# Patient Record
Sex: FEMALE | Race: WHITE | Employment: UNEMPLOYED | URBAN - METROPOLITAN AREA
[De-identification: names, ages, dates, MRNs, and addresses within clinical notes are randomized per-mention and may not be internally consistent; named-entity substitution may affect disease eponyms.]

---

## 2017-01-31 ENCOUNTER — HOSPITAL ENCOUNTER (OUTPATIENT)
Dept: ULTRASOUND IMAGING | Facility: CLINIC | Age: 32
Discharge: HOME | End: 2017-01-31
Attending: MIDWIFE | Admitting: MIDWIFE
Payer: MEDICAID

## 2017-01-31 DIAGNOSIS — Z34.82 PRENATAL CARE, SUBSEQUENT PREGNANCY, SECOND TRIMESTER: ICD-10-CM

## 2017-01-31 PROCEDURE — T1013 SIGN LANG/ORAL INTERPRETER: HCPCS | Mod: U3

## 2017-01-31 PROCEDURE — 76805 OB US >/= 14 WKS SNGL FETUS: CPT

## 2017-05-21 ENCOUNTER — HOSPITAL ENCOUNTER (OUTPATIENT)
Facility: CLINIC | Age: 32
Discharge: HOME | End: 2017-05-21
Attending: ADVANCED PRACTICE MIDWIFE | Admitting: ADVANCED PRACTICE MIDWIFE
Payer: COMMERCIAL

## 2017-05-21 ENCOUNTER — OFFICE VISIT (OUTPATIENT)
Dept: INTERPRETER SERVICES | Facility: CLINIC | Age: 32
End: 2017-05-21

## 2017-05-21 VITALS — BODY MASS INDEX: 31.09 KG/M2 | SYSTOLIC BLOOD PRESSURE: 114 MMHG | DIASTOLIC BLOOD PRESSURE: 63 MMHG | WEIGHT: 170 LBS

## 2017-05-21 PROCEDURE — T1013 SIGN LANG/ORAL INTERPRETER: HCPCS | Mod: U3

## 2017-05-21 PROCEDURE — 59025 FETAL NON-STRESS TEST: CPT

## 2017-05-21 PROCEDURE — 99214 OFFICE O/P EST MOD 30 MIN: CPT | Mod: 25

## 2017-05-21 RX ORDER — ONDANSETRON 2 MG/ML
4 INJECTION INTRAMUSCULAR; INTRAVENOUS EVERY 6 HOURS PRN
Status: DISCONTINUED | OUTPATIENT
Start: 2017-05-21 | End: 2017-05-21 | Stop reason: HOSPADM

## 2017-05-21 NOTE — IP AVS SNAPSHOT
[facility]    [address]    [place] [address]    Phone:  [phone]                                       After Visit Summary   5/21/2017    Yudelka Harris    MRN: [id number]           After Visit Summary Signature Page     I have received my discharge instructions, and my questions have been answered. I have discussed any challenges I see with this plan with the nurse or doctor.    ..........................................................................................................................................  Patient/Patient Representative Signature      ..........................................................................................................................................  Patient Representative Print Name and Relationship to Patient    ..................................................               ................................................  Date                                            Time    ..........................................................................................................................................  Reviewed by Signature/Title    ...................................................              ..............................................  Date                                                            Time

## 2017-05-21 NOTE — IP AVS SNAPSHOT
MRN:[id number]                      After Visit Summary   5/21/2017    Yudelka Harris    MRN: [id number]           Thank you!     Thank you for choosing [facility] for your care. Our goal is always to provide you with excellent care. Hearing back from our patients is one way we can continue to improve our services. Please take a few minutes to complete the written survey that you may receive in the mail after you visit with us. Thank you!        Patient Information     Date Of Birth          1985        Designated Caregiver       Most Recent Value    Caregiver    Will someone help with your care after discharge? no      About your hospital stay     You were admitted on:  May 21, 2017 You last received care in the:  [facility]    You were discharged on:  May 21, 2017       Who to Call     For medical emergencies, please call 911.  For non-urgent questions about your medical care, please call your primary care provider or clinic, None          Attending Provider     Provider Specialty    Sammy Gates APRN CNM [facility]       Primary Care Provider Fax #    [facility] [phone]       [address] [place] [address]        Further instructions from your care team       Discharge Instruction for Undelivered Patients      You were seen for: Labor Assessment  We Consulted: Sammy Gates CNM  You had (Test or Medicine):fetal and uterine monitoring, cervical exam     Diet:   Drink 8 to 12 glasses of liquids (milk, juice, water) every day.  You may eat meals and snacks.     Activity:  Count fetal kicks everyday (see handout)  Call your doctor or nurse midwife if your baby is moving less than usual.     Call your provider if you notice:  Swelling in your face or increased swelling in your hands or legs.  Headaches that are not relieved by Tylenol (acetaminophen).  Changes in your vision (blurring: seeing spots or stars.)  Nausea (sick to your stomach) and vomiting (throwing  "up).   Weight gain of 5 pounds or more per week.  Heartburn that doesn't go away.  Signs of bladder infection: pain when you urinate (use the toilet), need to go more often and more urgently.  The bag of jaquez (rupture of membranes) breaks, or you notice leaking in your underwear.  Bright red blood in your underwear.  Abdominal (lower belly) or stomach pain.  Second (plus) baby: Contractions (tightening) less than 10 minutes apart and getting stronger.  Increase or change in vaginal discharge (note the color and amount)  Other: more painful contractions    Follow-up:  As scheduled in the clinic          Pending Results     No orders found from 2017 to 2017.            Statement of Approval     Ordered          17 1223  I have reviewed and agree with all the recommendations and orders detailed in this document.  EFFECTIVE NOW     Approved and electronically signed by:  Sammy Gates APRN CNM             Admission Information     Date & Time Provider Department Dept. Phone    2017 Sammy Gates APRN CNM [facility] [phone]      Your Vitals Were     Blood Pressure Weight Last Period BMI (Body Mass Index)          114/63 77.1 kg (170 lb) 2016 31.09 kg/m2        MyChart Information     [employer] lets you send messages to your doctor, view your test results, renew your prescriptions, schedule appointments and more. To sign up, go to [email or web address] . Click on \"Log in\" on the left side of the screen, which will take you to the Welcome page. Then click on \"Sign up Now\" on the right side of the page.     You will be asked to enter the access code listed below, as well as some personal information. Please follow the directions to create your username and password.     Your access code is: [email or web address]  Expires: 2017 12:54 PM     Your access code will  in 90 days. If you need help or a new code, please call your [facility] clinic or [phone].        Care EveryWhere ID     " This is your Care EveryWhere ID. This could be used by other organizations to access your [facility] medical records  [id number]           Review of your medicines      UNREVIEWED medicines. Ask your doctor about these medicines        Dose / Directions    prenatal multivitamin  plus iron 27-0.8 MG Tabs per tablet   Used for:  Postpartum care and examination immediately after delivery        Dose:  1 tablet   Take 1 tablet by mouth daily   Quantity:  100 tablet   Refills:  3                Protect others around you: Learn how to safely use, store and throw away your medicines at www.disposemymeds.org.             Medication List: This is a list of all your medications and when to take them. Check marks below indicate your daily home schedule. Keep this list as a reference.      Medications           Morning Afternoon Evening Bedtime As Needed    prenatal multivitamin  plus iron 27-0.8 MG Tabs per tablet   Take 1 tablet by mouth daily                                          More Information        Recuento de patadas  Es normal que le preocupe la hamilton de vazquez bebé. Para saber si el bebé está john, usted puede anotar las veces que usted siente ronan pataditas en un registro de movimientos todos los días. Normalmente es posible sentir que el bebé se mueve a partir de la semana 20 del embarazo. No olvide llevar los registros de los movimientos del bebé a todas las citas que tenga con vazquez proveedor de atención médica.     Cómo contar los movimientos    Escoja wilton hora cuando el bebé esté activo, arnulfo por ejemplo después de wilton comida.    Siéntese cómodamente o acuéstese de lado.    La primera vez que el bebé se mueva, anote la hora.    Cuente cada movimiento hasta que el bebé se haya movido 10 veces. (Toccopola puede llevar entre 20 minutos y 2 horas.)    Si el bebé no se ha movido 4 veces en 1 hora, dése wilton palmadita en el abdomen para despertarlo.    Anote la hora en que sienta el décimo movimiento del bebé.    Trate de hacer  esto a la misma hora todos los días.  Cuándo debe llamar al proveedor de atención médica  Llame a vazquez proveedor de atención médica en el acto en cualquiera de los siguientes casos:     Si el bebé se mueve menos de 10 veces en 1 horas mientras usted está llevando la cuenta de las pataditas.    Si el bebé se mueve con mucha menos frecuencia que en días anteriores.    Si usted no ha sentido movimientos del bebé en todo el día.    [id number] Nessa \[facility] [place] [facility]\"", [address] [place] [address], [place] [address]. Todos los derechos reservados. Esta información no pretende sustituir la atención médica profesional. Sólo vazquez médico puede diagnosticar y tratar un problema de hamilton.

## 2017-05-21 NOTE — H&P
HOSPITAL TRIAGE NOTE  ===================    CHIEF COMPLAINT  ========================  Yudelka Harris is a 32 year old patient presenting today at 38w1d for evaluation of uterine contractions.    Patient's last menstrual period was 2016.  Estimated Date of Delivery: 2017     HPI  ==================   Pt presents to triage with contractions that started around midnight, have increased in intensity and frequency. Has also noticed some brownish-red and mucous-like discharge. Reports + fetal movement. Denies leaking of fluid or vaginal bleeding.   Prenatal record and labs reviewed from [facility], through faxed records.    CONTRACTIONS: mild and every 15 minutes  ABDOMINAL PAIN: none, back pain +  FETAL MOVEMENT: active    VAGINAL BLEEDING: scant, red-brown  RUPTURE OF MEMBRANES: no  PELVIC PAIN: none    PREGNANCY COMPLICATIONS: none  OTHER: n/a    REVIEW OF SYSTEMS  =====================  C: NEGATIVE for fever, chills  I: NEGATIVE for worrisome rashes, moles or lesions  E: NEGATIVE for vision changes or irritation  R: NEGATIVE for significant cough or SOB  CV: NEGATIVE for chest pain, palpitations or varicosities  GI: NEGATIVE for nausea, abdominal pain, heartburn, or change in bowel habits  : NEGATIVE for frequency, dysuria, or hematuria  M: NEGATIVE for significant arthralgias or myalgia  N: NEGATIVE for headache, weakness, dizziness or paresthesias  P: NEGATIVE for changes in mood or affect    PROBLEM LIST  ===============  Patient Active Problem List    Diagnosis Date Noted     Labor and delivery, indication for care 2017     Priority: Medium      (normal spontaneous vaginal delivery) 2014     Priority: Medium     HISTORIES  ==============  ALLERGIES:    No Known Allergies  PAST MEDICAL HISTORY  History reviewed. No pertinent past medical history.  SOCIAL HISTORY  Social History     Social History     Marital status:      Spouse name: Sidney      Number of children: N/A     Years of education: N/A     Occupational History     Not on file.     Social History Main Topics     Smoking status: Never Smoker     Smokeless tobacco: Not on file     Alcohol use No     Drug use: No     Sexual activity: Yes     Partners: Male     Other Topics Concern     Not on file     Social History Narrative     PARTNER: Sidney, bedside    FAMILY HISTORY  No family history on file.     OB HISTORY  Obstetric History       T1      TAB0   SAB0   E0   M0   L2       # Outcome Date GA Lbr Osmar/2nd Weight Sex Delivery Anes PTL Lv   3 Current            2 Term 14 39w6d 06:15 / 01:08 3.62 kg (7 lb 15.7 oz) F Vag-Spont EPI  Y      Name: INDERJIT COYLE,BABY1 OKSANA      Apgar1:  9                Apgar5: 9   1 Para                 Prenatal Labs:   Lab Results   Component Value Date    ABO O 2014    RH  Pos 2014    AS Neg 2014    HEPBANG neg 2014    TREPAB nonreactive 2014    HGB 10.6 (L) 2014    HIV neg 2014     Rubella- immune    ULTRASOUND(s) reviewed: wnl    EXAM  ============  /63  Wt 77.1 kg (170 lb)  LMP 2016  BMI 31.09 kg/m2  GENERAL APPEARANCE: healthy, alert and no distress  RESP: normal respiratory effort  ABDOMEN:  soft, nontender, no epigastric pain  SKIN: no suspicious lesions or rashes  NEURO: Denies headache, blurred vision, other vision changes  PSYCH: mentation appears normal. and affect normal/bright  MS/ LEGS: trace edema    CONTRACTIONS: mild and every 4-9 minutes   FETAL HEART TONES: continuous EFM- baseline 130 with moderate variability and positive accelerations. No decelerations.  NST: REACTIVE  EFW: 7.25    PELVIC EXAM: 3/60/-1/soft, posterior  JUAREZ SCORE: 8  PRESENTATION: VERTEX  BLOOD: yes, brown-ebonie red discharge on exam glove  DISCHARGE: none    ROM: no  AMNISURE: not done    LABS: none  Lab results reviewed- n/a    DIAGNOSIS  ============   at 38w1d seen on the [facility] Triage  for labor evaluation- early labor  NST: REACTIVE  Fetal Heart rate tracing: category one    PLAN  ============  Reviewed options with patient for observation/walking and re-evaluation or to be discharged home. Lives 5 minutes from hospital. Pt opts to go home at this time. To return when contractions increase in intensity.   Encouraged to eat, hydrate PO, and rest.  Discharge to home with labor instuctions per discharge instruction form  Call or return to the [facility] with contractions, cramping, abdominal or pelvic pain, vaginal bleeding, leaking fluid or decreased fetal movement.    Sammy Gates, APRN CNM

## 2017-05-21 NOTE — PLAN OF CARE
Pt. d/c to home undelivered.  Education completed with the .  Pt. Verbalizes understanding and denies question or concerns at this time.

## 2017-05-21 NOTE — DISCHARGE INSTRUCTIONS
Discharge Instruction for Undelivered Patients      You were seen for: Labor Assessment  We Consulted: Sammy Gates CNM  You had (Test or Medicine):fetal and uterine monitoring, cervical exam     Diet:   Drink 8 to 12 glasses of liquids (milk, juice, water) every day.  You may eat meals and snacks.     Activity:  Count fetal kicks everyday (see handout)  Call your doctor or nurse midwife if your baby is moving less than usual.     Call your provider if you notice:  Swelling in your face or increased swelling in your hands or legs.  Headaches that are not relieved by Tylenol (acetaminophen).  Changes in your vision (blurring: seeing spots or stars.)  Nausea (sick to your stomach) and vomiting (throwing up).   Weight gain of 5 pounds or more per week.  Heartburn that doesn't go away.  Signs of bladder infection: pain when you urinate (use the toilet), need to go more often and more urgently.  The bag of jaquez (rupture of membranes) breaks, or you notice leaking in your underwear.  Bright red blood in your underwear.  Abdominal (lower belly) or stomach pain.  Second (plus) baby: Contractions (tightening) less than 10 minutes apart and getting stronger.  Increase or change in vaginal discharge (note the color and amount)  Other: more painful contractions    Follow-up:  As scheduled in the clinic

## 2017-05-21 NOTE — PROGRESS NOTES
Pt. Presents to L&D for r/o/ labor.  She states she is having UC q 15 min.  She states she had some bleeding then mucous vaginally. Denies SROM. [place] and US torres.  LEI Gates CNM updated

## 2017-05-22 ENCOUNTER — HOSPITAL ENCOUNTER (INPATIENT)
Facility: CLINIC | Age: 32
LOS: 1 days | Discharge: HOME | End: 2017-05-23
Attending: ADVANCED PRACTICE MIDWIFE | Admitting: ADVANCED PRACTICE MIDWIFE
Payer: COMMERCIAL

## 2017-05-22 ENCOUNTER — ANESTHESIA EVENT (OUTPATIENT)
Dept: OBGYN | Facility: CLINIC | Age: 32
End: 2017-05-22
Payer: COMMERCIAL

## 2017-05-22 ENCOUNTER — ANESTHESIA (OUTPATIENT)
Dept: OBGYN | Facility: CLINIC | Age: 32
End: 2017-05-22
Payer: COMMERCIAL

## 2017-05-22 LAB
ABO + RH BLD: NORMAL
ABO + RH BLD: NORMAL
HGB BLD-MCNC: 13.4 G/DL (ref 11.7–15.7)
PLATELET # BLD AUTO: 185 10E9/L (ref 150–450)
SPECIMEN EXP DATE BLD: NORMAL

## 2017-05-22 PROCEDURE — 99215 OFFICE O/P EST HI 40 MIN: CPT

## 2017-05-22 PROCEDURE — 85018 HEMOGLOBIN: CPT | Performed by: ADVANCED PRACTICE MIDWIFE

## 2017-05-22 PROCEDURE — 12000030 ZZH R&B OB INTERMEDIATE UMMC

## 2017-05-22 PROCEDURE — 00HU33Z INSERTION OF INFUSION DEVICE INTO SPINAL CANAL, PERCUTANEOUS APPROACH: ICD-10-PCS | Performed by: ADVANCED PRACTICE MIDWIFE

## 2017-05-22 PROCEDURE — 86900 BLOOD TYPING SEROLOGIC ABO: CPT | Performed by: ADVANCED PRACTICE MIDWIFE

## 2017-05-22 PROCEDURE — 25000132 ZZH RX MED GY IP 250 OP 250 PS 637: Performed by: ADVANCED PRACTICE MIDWIFE

## 2017-05-22 PROCEDURE — 25000125 ZZHC RX 250: Performed by: ADVANCED PRACTICE MIDWIFE

## 2017-05-22 PROCEDURE — 25000128 H RX IP 250 OP 636

## 2017-05-22 PROCEDURE — 85049 AUTOMATED PLATELET COUNT: CPT | Performed by: ADVANCED PRACTICE MIDWIFE

## 2017-05-22 PROCEDURE — 0HQ9XZZ REPAIR PERINEUM SKIN, EXTERNAL APPROACH: ICD-10-PCS | Performed by: ADVANCED PRACTICE MIDWIFE

## 2017-05-22 PROCEDURE — 72200001 ZZH LABOR CARE VAGINAL DELIVERY SINGLE

## 2017-05-22 PROCEDURE — 25000128 H RX IP 250 OP 636: Performed by: ADVANCED PRACTICE MIDWIFE

## 2017-05-22 PROCEDURE — 3E0S3CZ INTRODUCTION OF REGIONAL ANESTHETIC INTO EPIDURAL SPACE, PERCUTANEOUS APPROACH: ICD-10-PCS | Performed by: ADVANCED PRACTICE MIDWIFE

## 2017-05-22 PROCEDURE — 86901 BLOOD TYPING SEROLOGIC RH(D): CPT | Performed by: ADVANCED PRACTICE MIDWIFE

## 2017-05-22 PROCEDURE — 99215 OFFICE O/P EST HI 40 MIN: CPT | Mod: 25

## 2017-05-22 PROCEDURE — 86780 TREPONEMA PALLIDUM: CPT | Performed by: ADVANCED PRACTICE MIDWIFE

## 2017-05-22 PROCEDURE — 59025 FETAL NON-STRESS TEST: CPT

## 2017-05-22 RX ORDER — OXYTOCIN/0.9 % SODIUM CHLORIDE 30/500 ML
PLASTIC BAG, INJECTION (ML) INTRAVENOUS
Status: DISCONTINUED
Start: 2017-05-22 | End: 2017-05-22 | Stop reason: HOSPADM

## 2017-05-22 RX ORDER — LIDOCAINE 40 MG/G
CREAM TOPICAL
Status: DISCONTINUED | OUTPATIENT
Start: 2017-05-22 | End: 2017-05-22

## 2017-05-22 RX ORDER — METHYLERGONOVINE MALEATE 0.2 MG/ML
200 INJECTION INTRAVENOUS
Status: DISCONTINUED | OUTPATIENT
Start: 2017-05-22 | End: 2017-05-22

## 2017-05-22 RX ORDER — EPHEDRINE SULFATE 50 MG/ML
5 INJECTION, SOLUTION INTRAMUSCULAR; INTRAVENOUS; SUBCUTANEOUS
Status: DISCONTINUED | OUTPATIENT
Start: 2017-05-22 | End: 2017-05-23 | Stop reason: HOSPADM

## 2017-05-22 RX ORDER — OXYTOCIN/0.9 % SODIUM CHLORIDE 30/500 ML
100 PLASTIC BAG, INJECTION (ML) INTRAVENOUS CONTINUOUS
Status: DISCONTINUED | OUTPATIENT
Start: 2017-05-22 | End: 2017-05-23 | Stop reason: HOSPADM

## 2017-05-22 RX ORDER — OXYTOCIN 10 [USP'U]/ML
10 INJECTION, SOLUTION INTRAMUSCULAR; INTRAVENOUS
Status: DISCONTINUED | OUTPATIENT
Start: 2017-05-22 | End: 2017-05-22

## 2017-05-22 RX ORDER — IBUPROFEN 800 MG/1
800 TABLET, FILM COATED ORAL
Status: DISCONTINUED | OUTPATIENT
Start: 2017-05-22 | End: 2017-05-22

## 2017-05-22 RX ORDER — CARBOPROST TROMETHAMINE 250 UG/ML
250 INJECTION, SOLUTION INTRAMUSCULAR
Status: DISCONTINUED | OUTPATIENT
Start: 2017-05-22 | End: 2017-05-22

## 2017-05-22 RX ORDER — NALBUPHINE HYDROCHLORIDE 10 MG/ML
2.5-5 INJECTION, SOLUTION INTRAMUSCULAR; INTRAVENOUS; SUBCUTANEOUS EVERY 6 HOURS PRN
Status: DISCONTINUED | OUTPATIENT
Start: 2017-05-22 | End: 2017-05-23 | Stop reason: HOSPADM

## 2017-05-22 RX ORDER — PENICILLIN G POTASSIUM 5000000 [IU]/1
5 INJECTION, POWDER, FOR SOLUTION INTRAMUSCULAR; INTRAVENOUS ONCE
Status: COMPLETED | OUTPATIENT
Start: 2017-05-22 | End: 2017-05-22

## 2017-05-22 RX ORDER — LIDOCAINE HYDROCHLORIDE 10 MG/ML
INJECTION, SOLUTION EPIDURAL; INFILTRATION; INTRACAUDAL; PERINEURAL
Status: DISCONTINUED
Start: 2017-05-22 | End: 2017-05-22 | Stop reason: HOSPADM

## 2017-05-22 RX ORDER — NALOXONE HYDROCHLORIDE 0.4 MG/ML
.1-.4 INJECTION, SOLUTION INTRAMUSCULAR; INTRAVENOUS; SUBCUTANEOUS
Status: DISCONTINUED | OUTPATIENT
Start: 2017-05-22 | End: 2017-05-22

## 2017-05-22 RX ORDER — ACETAMINOPHEN 325 MG/1
650 TABLET ORAL EVERY 4 HOURS PRN
Status: DISCONTINUED | OUTPATIENT
Start: 2017-05-22 | End: 2017-05-22

## 2017-05-22 RX ORDER — ACETAMINOPHEN 325 MG/1
650 TABLET ORAL EVERY 4 HOURS PRN
Status: DISCONTINUED | OUTPATIENT
Start: 2017-05-22 | End: 2017-05-23 | Stop reason: HOSPADM

## 2017-05-22 RX ORDER — SODIUM CHLORIDE, SODIUM LACTATE, POTASSIUM CHLORIDE, CALCIUM CHLORIDE 600; 310; 30; 20 MG/100ML; MG/100ML; MG/100ML; MG/100ML
INJECTION, SOLUTION INTRAVENOUS
Status: COMPLETED
Start: 2017-05-22 | End: 2017-05-22

## 2017-05-22 RX ORDER — HYDROCORTISONE 2.5 %
CREAM (GRAM) TOPICAL 3 TIMES DAILY PRN
Status: DISCONTINUED | OUTPATIENT
Start: 2017-05-22 | End: 2017-05-23 | Stop reason: HOSPADM

## 2017-05-22 RX ORDER — OXYTOCIN 10 [USP'U]/ML
INJECTION, SOLUTION INTRAMUSCULAR; INTRAVENOUS
Status: DISCONTINUED
Start: 2017-05-22 | End: 2017-05-22 | Stop reason: HOSPADM

## 2017-05-22 RX ORDER — OXYTOCIN/0.9 % SODIUM CHLORIDE 30/500 ML
340 PLASTIC BAG, INJECTION (ML) INTRAVENOUS CONTINUOUS PRN
Status: DISCONTINUED | OUTPATIENT
Start: 2017-05-22 | End: 2017-05-23 | Stop reason: HOSPADM

## 2017-05-22 RX ORDER — OXYCODONE AND ACETAMINOPHEN 5; 325 MG/1; MG/1
1 TABLET ORAL
Status: DISCONTINUED | OUTPATIENT
Start: 2017-05-22 | End: 2017-05-22

## 2017-05-22 RX ORDER — SODIUM CHLORIDE, SODIUM LACTATE, POTASSIUM CHLORIDE, CALCIUM CHLORIDE 600; 310; 30; 20 MG/100ML; MG/100ML; MG/100ML; MG/100ML
INJECTION, SOLUTION INTRAVENOUS CONTINUOUS
Status: DISCONTINUED | OUTPATIENT
Start: 2017-05-22 | End: 2017-05-22

## 2017-05-22 RX ORDER — AMOXICILLIN 250 MG
1-2 CAPSULE ORAL 2 TIMES DAILY
Status: DISCONTINUED | OUTPATIENT
Start: 2017-05-22 | End: 2017-05-23 | Stop reason: HOSPADM

## 2017-05-22 RX ORDER — IBUPROFEN 400 MG/1
400-800 TABLET, FILM COATED ORAL EVERY 6 HOURS PRN
Status: DISCONTINUED | OUTPATIENT
Start: 2017-05-22 | End: 2017-05-23 | Stop reason: HOSPADM

## 2017-05-22 RX ORDER — NALOXONE HYDROCHLORIDE 0.4 MG/ML
.1-.4 INJECTION, SOLUTION INTRAMUSCULAR; INTRAVENOUS; SUBCUTANEOUS
Status: DISCONTINUED | OUTPATIENT
Start: 2017-05-22 | End: 2017-05-23 | Stop reason: HOSPADM

## 2017-05-22 RX ORDER — LANOLIN 100 %
OINTMENT (GRAM) TOPICAL
Status: DISCONTINUED | OUTPATIENT
Start: 2017-05-22 | End: 2017-05-23 | Stop reason: HOSPADM

## 2017-05-22 RX ORDER — OXYTOCIN/0.9 % SODIUM CHLORIDE 30/500 ML
100-340 PLASTIC BAG, INJECTION (ML) INTRAVENOUS CONTINUOUS PRN
Status: COMPLETED | OUTPATIENT
Start: 2017-05-22 | End: 2017-05-22

## 2017-05-22 RX ORDER — OXYTOCIN 10 [USP'U]/ML
10 INJECTION, SOLUTION INTRAMUSCULAR; INTRAVENOUS
Status: DISCONTINUED | OUTPATIENT
Start: 2017-05-22 | End: 2017-05-23 | Stop reason: HOSPADM

## 2017-05-22 RX ORDER — FENTANYL CITRATE 50 UG/ML
50-100 INJECTION, SOLUTION INTRAMUSCULAR; INTRAVENOUS
Status: DISCONTINUED | OUTPATIENT
Start: 2017-05-22 | End: 2017-05-22

## 2017-05-22 RX ORDER — MISOPROSTOL 200 UG/1
400 TABLET ORAL
Status: DISCONTINUED | OUTPATIENT
Start: 2017-05-22 | End: 2017-05-23 | Stop reason: HOSPADM

## 2017-05-22 RX ORDER — ONDANSETRON 2 MG/ML
4 INJECTION INTRAMUSCULAR; INTRAVENOUS EVERY 6 HOURS PRN
Status: DISCONTINUED | OUTPATIENT
Start: 2017-05-22 | End: 2017-05-22

## 2017-05-22 RX ADMIN — OXYTOCIN-SODIUM CHLORIDE 0.9% IV SOLN 30 UNIT/500ML 340 ML/HR: 30-0.9/5 SOLUTION at 18:23

## 2017-05-22 RX ADMIN — PENICILLIN G POTASSIUM 5 MILLION UNITS: 5000000 POWDER, FOR SOLUTION INTRAMUSCULAR; INTRAPLEURAL; INTRATHECAL; INTRAVENOUS at 12:52

## 2017-05-22 RX ADMIN — LIDOCAINE HYDROCHLORIDE 10 ML: 10 INJECTION, SOLUTION EPIDURAL; INFILTRATION; INTRACAUDAL; PERINEURAL at 18:31

## 2017-05-22 RX ADMIN — Medication 8 ML/HR: at 12:55

## 2017-05-22 RX ADMIN — SODIUM CHLORIDE, POTASSIUM CHLORIDE, SODIUM LACTATE AND CALCIUM CHLORIDE 1000 ML: 600; 310; 30; 20 INJECTION, SOLUTION INTRAVENOUS at 12:53

## 2017-05-22 RX ADMIN — IBUPROFEN 800 MG: 400 TABLET ORAL at 19:07

## 2017-05-22 RX ADMIN — SENNOSIDES AND DOCUSATE SODIUM 1 TABLET: 8.6; 5 TABLET ORAL at 21:38

## 2017-05-22 RX ADMIN — LIDOCAINE HYDROCHLORIDE,EPINEPHRINE BITARTRATE 3 ML: 15; .005 INJECTION, SOLUTION EPIDURAL; INFILTRATION; INTRACAUDAL; PERINEURAL at 12:50

## 2017-05-22 RX ADMIN — Medication 2.5 MILLION UNITS: at 16:32

## 2017-05-22 NOTE — PROGRESS NOTES
Blood pressure 99/57, temperature 98.2  F (36.8  C), temperature source Oral, resp. rate 16, last menstrual period 2016, SpO2 97 %, unknown if currently breastfeeding.    General appearance: comfortable  Not feeling pressure. SROM clear fluid after st cath of 700cc.  Complete at 0 station with forebag still palpable, AROM with pt consent.loose anterior lip reduced with next contr  Pushed x2 without descent, not feeling pressure  CONTACTIONS: moderate and every 2-6 minutes  Pitocin- none,  Antibiotics- PCN  FETAL HEART TONES: continuous EFM- baseline 135 with moderate variability and variable decelerations with pushing. Resolved with laboring down.  ROM: clear fluid  PELVIC EXAM:complete/0station  ASSESSMENT:  ==============  IUP @ 38w2d second stage   Fetal Heart Rate Tracing category two  GBS- positive- adequately treated    PLAN:  ===========  Labor down until urge to push or pressure  Anticipate    Close observation  SONALI DavidsonM

## 2017-05-22 NOTE — PLAN OF CARE
Pt arrived to labor and delivery very uncomfortable complaining of ctx. SVE /0 membranes intact. Pt requesting epidural. Pt oriented to room, call light in reach. Pt bill of rights given. Plan for admission for . Pt agreeable.

## 2017-05-22 NOTE — L&D DELIVERY NOTE
Delivery Summary    Yudelka Harris MRN# [id number]   Age: 32 year old YOB: 1985     ASSESSMENT & PLAN:   DELIVERY NOTE:  Brief Labor Course: pt arrived in active labor, pos GBS adequately treated. had good relief from epidural and progressed to complete. Labored down x1 hour and pushed well.  Delivery Note:    viable Fe, tight CAN, somersault out. Cried with stimulation and placed on moms abd. Spont ng placenta delivered intact. 1st degree laceration repaired without complication in the usual fashion. Mom and baby stable  IUP at 38 weeks gestation delivered on May 22, 2017.     delivery of a viable Female infant.  Weight : pending  Apgars of 8 at 1 minute and 9 at 5 minutes.  Labor was spontaneous.  Medications administered  in labor:  Pain Rx Epidural; Antibiotics Yes: PCN and IV antibiotics infused greater than 4 hours; Other   Perineum: 1st degree  Placenta-mechanism: spontaneous, intact,  with a 3 vessel cord. IV oxytocin was given After delivery of baby  Quantitative Blood Loss was 179cc.  Complications of labor and delivery: tight CAN  Anticipated Discharge Date: 17  Birth attendants: SONALI Wilson CNM, CNM          Labor Event Times    Labor onset date:  17 Onset time:  12:09 PM   Dilation complete date:  17 Complete time:   4:44 PM   Start pushing date/time:  2017 1807            Labor Length    1st Stage (hrs):  4 (min):  35   2nd Stage (hrs):  1 (min):  35   3rd Stage (hrs):  0 (min):  6      Labor Events     labor?:  No    steroids:  None   Labor Type:  Spontaneous      Antibiotics received during labor?:  Yes   Reason for Antibiotics:  GBS   Antibiotics received for GBS:  Penicillin   Antibiotics Given (GBS):  Greater than 4 hours prior to delivery      Rupture identifier:  Rupture 1   Rupture date/time: 17 1640   Rupture type:  Spontaneous rupture of membranes occuring during spontaneous  labor or augmentation   Fluid color:  Clear   Fluid odor:  Normal      1:1 continuous labor support provided by?:  RN Labor partogram used?:  no         Delivery/Placenta Date and Time    Delivery Date:  17 Delivery Time:   6:19 PM   Placenta Date/Time:  2017  6:25 PM   Oxytocin given at the time of delivery:  after delivery of baby      Vaginal Counts    Initial count performed by 2 team members:   Two Team Members   GERMAN Prince CNM          Needles Suture [employer]   Initial counts 2 0 5    Added to count  1     Final counts 2 1 5       Placed during labor Accounted for at the end of labor   NA NA   NA NA   NA NA      Final count performed by 2 team members:   Two Team Members   GERMAN Prince CNM         Final count correct?:  Yes         Apgars    Living status:  Yes    1 Minute 5 Minute 10 Minute 15 Minute 20 Minute   Skin color: 1  1       Heart rate: 2  2       Reflex irritability: 2  2       Muscle tone: 1  2       Respiratory effort: 2  2       Total: 8  9          Apgars assigned by:  GERMAN DYER RN      Cord    Vessels:  3 Vessels Complications:  Nuchal   Cord Blood Disposition:  Lab Gases Sent?:  No         [facility] Resuscitation    Methods:  None      [facility] Care at Delivery:      placed on mother's chest  Dried and stim with warm blankets  Cried.       Skin to Skin and Feeding Plan    Skin to skin initiation date/time: 17   Skin to skin with:  Mother   Skin to skin end date/time:     Breastfeeding initiated date/time:  2017 1849   How do you plan to feed your baby:  Breastfeeding      Labor Events and Shoulder Dystocia    Fetal Tracing Prior to Delivery:  Category 1   Shoulder dystocia present?:  Neg            Delivery (Maternal) (Provider to Complete) (032884)    Episiotomy:  None   Perineal lacerations:  1st    Vaginal laceration?:  No    Cervical laceration?:  No          Mother's Information  Mother: Lily Harris  Yudelka #[id number]    Start of Mother's Information     IO Blood Loss  05/22/17 1209 - 05/22/17 1852    Mom's I/O Activity            End of Mother's Information  Mother: Lily Harris Yudelka #[id number]            Delivery - Provider to Complete (364780)    Delivering clinician:  HATTIE BETANCUR CNM Care:  Exclusive CNM care in labor   Attempted Delivery Types (Choose all that apply):  Spontaneous Vaginal Delivery   Delivery Type (Choose the 1 that will go to the Birth History):  Vaginal, Spontaneous Delivery                           Placenta    Delayed Cord Clamping:  Done   Date/Time:  5/22/2017  6:25 PM   Removal:  Spontaneous   Disposition:  Hospital disposal      Anesthesia    Method:  Epidural   Cervical dilation at placement:  4-7         Presentation and Position    Presentation:  Vertex    Occiput Anterior                    SONALI Davidson CNM

## 2017-05-22 NOTE — H&P
ADMIT NOTE  =================  38w2d    Oksana Harris is a 32 year old female with an Patient's last menstrual period was 2016. and Estimated Date of Delivery: Fernando 3, 2017 is [facility] on 2017 at 12:27 PM with in active labor.     HPI  ================  German and english speaking pt of [facility].  requested.  contr began yesterday, was sent home in early labor. Contracted all night, stronger this AM. No bleeding or ROM. Uncomfortable with contr.  here  Contractions- strong, cramping and back pain  Fetal movement- active  ROM- no   Vaginal bleeding- none  GBS- positive  FOB- is involved,   Other labor support- none    Weight gain- 134 - 171 lbs, Total weight gain- 37 lbs  Height- 5-2  BMI- 24  First prenatal visit at 12 weeks, Total visits- 7    PROBLEM LIST  =================  Patient Active Problem List    Diagnosis Date Noted     Labor and delivery, indication for care 2017     Priority: Medium      (normal spontaneous vaginal delivery) 2014     Priority: Medium       HISTORIES  ============  No Known Allergies  History reviewed. No pertinent past medical history.  History reviewed. No pertinent surgical history..  No family history on file.  Social History   Substance Use Topics     Smoking status: Never Smoker     Smokeless tobacco: Not on file     Alcohol use No     Obstetric History       T2      TAB0   SAB0   E0   M0   L2       # Outcome Date GA Lbr Osmar/2nd Weight Sex Delivery Anes PTL Lv   3 Current            2 Term 14 39w6d 06:15 / 01:08 3.62 kg (7 lb 15.7 oz) F Vag-Spont EPI  Y      Name: INDERJIT HARRIS,BABY1 OKSANA      Apgar1:  9                Apgar5: 9   1 Term     F    Y           LABS:   ===========  Prenatal Labs reviewed per [facility] chart:  O pos  Rubella immune  HIV neg  HBsAg neg  Antitrep- NR  Rhogam not indicated   GBS bacteruria pos 16    Other labs:  No results found for this or any previous  visit (from the past 24 hour(s)).    ROS  =========  Pt denies significant respiratory, cardiovacular, GI, or muscular/skeletalcomplaints.    See RN data base ROS.       PHYSICAL EXAM:  ===============  /79  Temp 97.7  F (36.5  C) (Axillary)  Resp 20  LMP 08/27/2016  General appearance: uncomfortable with contractions  GENERAL APPEARANCE: healthy, alert and no distress  RESP: lungs clear to auscultation - no rales, rhonchi or wheezes  CV: regular rates and rhythm, normal S1 S2, no S3 or S4 and no murmur,and no varicosities  ABDOMEN:  soft, nontender, no epigastric pain  SKIN: no suspicious lesions or rashes  NEURO: Denies headache, blurred vision, other vision changes  PSYCH: mentation appears normal. and affect normal/bright  Legs: trace edema      Abdomen: gravid, vertex fetus per Leopold's, non-tender between contractions.   Cephalic presentation confirmed by BSUS  EFW-  7 lbs.   CONTACTIONS: moderate, strong, cramping, back pain and every 3-5 minutes  FETAL HEART TONES: continuous EFM- baseline 160 with moderate variability and no accelerations. Intermittent late decelerations while in SF position.  PELVIC EXAM: 7/ 90%/ Anterior/ soft/ 0   JUAREZ SCORE: na  BLOODY SHOW: yes small with exam   ROM:no  FLUID: none  AMNISURE: not done    ASSESSMENT:  ==============  IUP @ 38w2d admitted in active labor   NST NON-REACTIVE  Fetal Heart Rate - category two  GBS- positive   PLAN:  ===========  Continuous EFM  Admit - see IP orders  Pain medication options reviewed. Pt is interested in epidural now  Prophylactic IV antibiotic for positive GBS status reviewed with pt. Agreeable to PCN.  MD consultant on call ward/ available prn  Ambulation, hydration, position changes, birthing ball and tub options to facilitate labor reviewed with pt .  SONALI Davidson CNM

## 2017-05-22 NOTE — ANESTHESIA PREPROCEDURE EVALUATION
Anesthesia Evaluation       history and physical reviewed .             ROS/MED HX    ENT/Pulmonary:  - neg pulmonary ROS     Neurologic:  - neg neurologic ROS     Cardiovascular:  - neg cardiovascular ROS       METS/Exercise Tolerance:     Hematologic:         Musculoskeletal:         GI/Hepatic:  - neg GI/hepatic ROS       Renal/Genitourinary:         Endo:         Psychiatric:         Infectious Disease:         Malignancy:         Other:                     Physical Exam      Airway   Mallampati: II  TM distance: > 3 FB  Neck ROM: full  Mouth opening: > 3 cm    Dental     Cardiovascular       Pulmonary     Other findings: Denies blood thinners or bleeding disorders      neg OB ROS                 Anesthesia Plan      History & Physical Review      ASA Status:  .  OB Epidural Asa: 2       Plan for     Discussed risks and benefits of epidural with patient. They understand the risks include headache, pruritus, nausea/vomiting, blood pressure changes, bleeding, infection, nerve damage, and failure of block. Questions answered. Patient consents.         Postoperative Care      Consents

## 2017-05-22 NOTE — IP AVS SNAPSHOT
[facility]    [address] [address]    Phone:  [phone]                                       After Visit Summary   5/22/2017    Yudelka Harris    MRN: [id number]           After Visit Summary Signature Page     I have received my discharge instructions, and my questions have been answered. I have discussed any challenges I see with this plan with the nurse or doctor.    ..........................................................................................................................................  Patient/Patient Representative Signature      ..........................................................................................................................................  Patient Representative Print Name and Relationship to Patient    ..................................................               ................................................  Date                                            Time    ..........................................................................................................................................  Reviewed by Signature/Title    ...................................................              ..............................................  Date                                                            Time

## 2017-05-22 NOTE — PROGRESS NOTES
Blood pressure 110/56, temperature 97.7  F (36.5  C), temperature source Axillary, resp. rate 20, last menstrual period 2016, SpO2 100 %, unknown if currently breastfeeding.  Patient Vitals for the past 24 hrs:   BP Temp Temp src Resp SpO2   17 1330 110/56 - - - -   17 1325 109/56 - - - -   17 1320 119/61 - - - 100 %   17 1315 118/62 - - - 100 %   17 1310 114/61 - - - -   17 1305 112/61 - - - -   17 1303 116/63 - - - -   17 1301 119/70 - - - -   17 1259 113/84 - - - -   17 1257 113/73 - - - 100 %   17 1255 119/75 - - - -   17 1253 115/84 - - - -   17 1209 141/79 97.7  F (36.5  C) Axillary 20 -      here  General appearance: comfortable after epidural. No pressure now  No HA/ vision change/ epigastric pain.  CONTACTIONS: difficult to track with toco. Palpate about q5  Pitocin- none,  Antibiotics- PCN started  FETAL HEART TONES: continuous EFM- baseline 135 with moderate variability and positive accelerations. No decelerations.  ROM: not ruptured  PELVIC EXAM:deferred  ASSESSMENT:  ==============  IUP @ 38w2d in active labor   Fetal Heart Rate Tracing category one  GBS- positive- antibiotics started    PLAN:  ===========  Frequent position changes to facilitate labor with epidural anesthesia.  Reevaluate in 2-4 hours prn  Anticipate   Continue prophylactic IV antibiotic PCN for positive GBS status.   SONALI Davidson CNM

## 2017-05-22 NOTE — PLAN OF CARE
Pt does not like feeling of numbness but appreciates pain control from epidural. No pressure. Discussed intermittent cath. Ivorian int present. Vss. Enc rest until baby comes.

## 2017-05-22 NOTE — IP AVS SNAPSHOT
MRN:[id number]                      After Visit Summary   5/22/2017    Yudelka Harris    MRN: [id number]           Thank you!     Thank you for choosing [facility] for your care. Our goal is always to provide you with excellent care. Hearing back from our patients is one way we can continue to improve our services. Please take a few minutes to complete the written survey that you may receive in the mail after you visit with us. Thank you!        Patient Information     Date Of Birth          1985        Designated Caregiver       Most Recent Value    Caregiver    Will someone help with your care after discharge? no      About your hospital stay     You were admitted on:  May 22, 2017 You last received care in the:  [facility]    You were discharged on:  May 23, 2017       Who to Call     For medical emergencies, please call 911.  For non-urgent questions about your medical care, please call your primary care provider or clinic, None          Attending Provider     Provider Specialty    Bessie Prince APRN CNM [facility]       Primary Care Provider Fax #    [facility] [phone]       [facility] [address] [address]        Further instructions from your care team       Vaginal Delivery Discharge Instructions: Pashto  Actividad:     Pida a los miembros de vazquez king y amigos que la ayuden cuando lo necesite.    No ponga nada en vazquez vagina hasta que vazquez médico lo permita.    Tómese las próximas semanas con calma para que vazquez cuerpo tenga tiempo de recuperarse. En fabrizio momento puede hacer cualquier actividad que sienta que puede.    No conduzca si está tomando píldoras para el dolor recetadas por vazquez médico. Puede conducir si está tomando píldoras de venta javier para el dolor.    Llame a vazquez proveedor de atención médica si tiene alguno de estos síntomas:    Empapa wilton toalla femenina con bárbara en el correr de 1 hora o ve coágulos más grandes que wilton pelota de  golf.    Sangrado que dura más de 6 semanas.    Tiene wilton secreción vaginal que huele mal.     Fiebre de 100.4  F (38  C) o más (temperatura tomada bajo vazquez lengua) con o sin escalofríos     Dolor, calambres o sensibilidad graves en la región inferior de vazquez vientre.    Aumento del dolor, hinchazón, enrojecimiento o líquido alrededor de ronan puntos.    Necesidad más frecuente o urgente de orinar (hacer pis), o ardor al hacerlo.    Enrojecimiento, hinchazón o dolor alrededor de wilton vena en vazquez pierna.    Problemas para amamantar o un área enrojecida o dolorosa en vazquez pecho.    Dolor que aumenta o no se va de wilton episiotomía o desgarro en el perineo.    Náuseas y vómitos    Dolor en el pecho y tos o dificultad para respirar.    Problemas para manejar la tristeza, ansiedad o depresión.     Si le preocupa hacerse daño o hacerle daño al bebé, llame al médico de inmediato.     Tiene preguntas o inquietudes después de regresar a casa.    Mantenga ronan heriberto limpias:  Lávese siempre las heriberto antes de tocar el área de vazquez perineo y los puntos.  [place] ayuda a reducir vazquez riesgo de infección.  Si ronan heriberto no están sucias, puede usar un gel de alcohol para limpiarse las heriberto. Mantenga ronan uñas cortas y limpias.      Vaginal Delivery Discharge Instructions  Activity:     Ask family and friends for help when you need it.    Do not place anything in your vagina until your doctor approves.    Take it easy for the next few weeks to allow your body to recover. You may do any activities you feel up to at that point.    Do not drive while taking pain pills prescribed by your doctor. You may drive if taking over-the-counter pain pills.    Call your health care provider if you have any of these symptoms:    You soak a sanitary pad with blood within 1 hour, or you see blood clots larger than a golf ball.    Bleeding that lasts more than 6 weeks.    You have vaginal discharge that smells bad.     A fever of 100.4  F (38  C) or higher  "(temperature taken under your tongue), with or without chills     Severe, pain, cramping or tenderness in your lower belly area.    Increased pain, swelling, redness or fluid around your stitches.    A more frequent or urgent need to urinate (pee), or it burns when you pee.    Redness, swelling or pain around a vein in your leg.    Problems breastfeeding, or a red or painful area on your breast.    Pain that increases or does not go away from an episiotomy or perineal tear.    Nausea and vomiting.    Chest pain and cough or are gasping for air.    Problems coping with sadness, anxiety, or depression.     If you have any concerns about hurting yourself or the baby, call your doctor right away.     You have questions or concerns after you return home.    Keep your hands clean:  Always wash your hands before touching your perineal area and stitches.  This helps reduce your risk of infection.  If your hands aren t dirty, you may use an alcohol hand-rub to clean your hands. Keep your nails clean and short.        Pending Results     No orders found for last 3 day(s).            Admission Information     Date & Time Provider Department Dept. Phone    [facility]22/2017 [facility] [phone]      Your Vitals Were     Blood Pressure Pulse Temperature Respirations Last Period Pulse Oximetry    107/63 75 98.1  F (36.7  C) (Oral) 18 08/27/2016 97%      MyChart Information     [employer] lets you send messages to your doctor, view your test results, renew your prescriptions, schedule appointments and more. To sign up, go to [email or web address] . Click on \"Log in\" on the left side of the screen, which will take you to the Welcome page. Then click on \"Sign up Now\" on the right side of the page.     You will be asked to enter the access code listed below, as well as some personal information. Please follow the directions to create your username and password.     Your access code is: " [email or web address]  Expires: 2017 12:54 PM     Your access code will  in 90 days. If you need help or a new code, please call your [facility] clinic or [phone].        Care EveryWhere ID     This is your Care EveryWhere ID. This could be used by other organizations to access your [facility] medical records  [id number]           Review of your medicines      START taking        Dose / Directions    acetaminophen 325 MG tablet   Commonly known as:  TYLENOL   Used for:   (normal spontaneous vaginal delivery)        Dose:  650 mg   Take 2 tablets (650 mg) by mouth every 6 hours as needed for mild pain or fever (greater than or equal to 38?C /100.4 F (oral))   Quantity:  100 tablet   Refills:  1       ibuprofen 400 MG tablet   Commonly known as:  ADVIL/MOTRIN   Used for:   (normal spontaneous vaginal delivery)        Dose:  400-800 mg   Take 1-2 tablets (400-800 mg) by mouth every 6 hours as needed for other (cramping)   Quantity:  120 tablet   Refills:  1       senna-docusate 8.6-50 MG per tablet   Commonly known as:  SENOKOT-S;PERICOLACE   Used for:   (normal spontaneous vaginal delivery)        Dose:  1-2 tablet   Take 1-2 tablets by mouth 2 times daily as needed for constipation   Quantity:  100 tablet   Refills:  1         CONTINUE these medicines which have NOT CHANGED        Dose / Directions    prenatal multivitamin  plus iron 27-0.8 MG Tabs per tablet   Used for:  Postpartum care and examination immediately after delivery        Dose:  1 tablet   Take 1 tablet by mouth daily   Quantity:  100 tablet   Refills:  3            Where to get your medicines      These medications were sent to [facility] [address]  [address] [address]     Phone:  [phone]     ibuprofen 400 MG tablet    senna-docusate 8.6-50 MG per tablet         Some of these will need a paper prescription and others can be bought over the counter. Ask your nurse  if you have questions.     Bring a paper prescription for each of these medications     acetaminophen 325 MG tablet                Protect others around you: Learn how to safely use, store and throw away your medicines at www.disposemymeds.org.             Medication List: This is a list of all your medications and when to take them. Check marks below indicate your daily home schedule. Keep this list as a reference.      Medications           Morning Afternoon Evening Bedtime As Needed    acetaminophen 325 MG tablet   Commonly known as:  TYLENOL   Take 2 tablets (650 mg) by mouth every 6 hours as needed for mild pain or fever (greater than or equal to 38?C /100.4 F (oral))                                ibuprofen 400 MG tablet   Commonly known as:  ADVIL/MOTRIN   Take 1-2 tablets (400-800 mg) by mouth every 6 hours as needed for other (cramping)   Last time this was given:  800 mg on 5/23/2017  5:00 PM                                prenatal multivitamin  plus iron 27-0.8 MG Tabs per tablet   Take 1 tablet by mouth daily                                senna-docusate 8.6-50 MG per tablet   Commonly known as:  SENOKOT-S;PERICOLACE   Take 1-2 tablets by mouth 2 times daily as needed for constipation   Last time this was given:  2 tablets on 5/23/2017 11:05 AM

## 2017-05-22 NOTE — ANESTHESIA PROCEDURE NOTES
Epidural Procedure Note    Staff:     Anesthesiologist:  VAUGHN DENIS  Location: OB     Procedure start time:  5/22/2017 12:37 PM     Procedure end time:  5/22/2017 12:55 PM   Pre-procedure checklist:   patient identified, IV checked, site marked, risks and benefits discussed, informed consent, monitors and equipment checked, pre-op evaluation, at physician/surgeon's request and post-op pain management      Correct Patient: Yes      Correct Position: Yes      Correct Site: Yes      Correct Procedure: Yes      Correct Laterality:  N/A    Site Marked:  N/A  Procedure:     Procedure:  Epidural catheter    ASA:  2    Position:  Sitting    Sterile Prep: chloraprep      Insertion site:  L3-4    Local skin infiltration:  1% lidocaine    amount (mL):  3    Approach:  Midline    Needle gauge (G):  17    Needle Length (in):  3.5    Block Needle Type:  Jermaineuhnathanael    Injection Technique:  LORT saline    HAFSA at (cm):  4    Attempts:  3    Redirects:  2    Catheter gauge (G):  19    Catheter threaded easily: Yes      Threaded to cm at skin:  8    Threaded in epidural space (cm):  4    Paresthesias:  No    Aspiration negative for Heme or CSF: Yes      Test dose (mL):  3     Local anesthetic:  Lidocaine 1.5% w/ 1:200,000 epinephrine    Test dose time:  12:50  Assessment/Narrative:      NO paresthesias. Difficult positioning.

## 2017-05-23 VITALS
DIASTOLIC BLOOD PRESSURE: 63 MMHG | SYSTOLIC BLOOD PRESSURE: 107 MMHG | HEART RATE: 75 BPM | OXYGEN SATURATION: 97 % | RESPIRATION RATE: 18 BRPM | TEMPERATURE: 98.1 F

## 2017-05-23 LAB
HGB BLD-MCNC: 11.2 G/DL (ref 11.7–15.7)
T PALLIDUM IGG+IGM SER QL: NEGATIVE

## 2017-05-23 PROCEDURE — 85018 HEMOGLOBIN: CPT | Performed by: ADVANCED PRACTICE MIDWIFE

## 2017-05-23 PROCEDURE — 25000132 ZZH RX MED GY IP 250 OP 250 PS 637: Performed by: ADVANCED PRACTICE MIDWIFE

## 2017-05-23 PROCEDURE — 36415 COLL VENOUS BLD VENIPUNCTURE: CPT | Performed by: ADVANCED PRACTICE MIDWIFE

## 2017-05-23 RX ORDER — ACETAMINOPHEN 325 MG/1
650 TABLET ORAL EVERY 6 HOURS PRN
Qty: 100 TABLET | Refills: 1 | Status: SHIPPED | OUTPATIENT
Start: 2017-05-23

## 2017-05-23 RX ORDER — AMOXICILLIN 250 MG
1-2 CAPSULE ORAL 2 TIMES DAILY PRN
Qty: 100 TABLET | Refills: 1 | Status: SHIPPED | OUTPATIENT
Start: 2017-05-23

## 2017-05-23 RX ORDER — IBUPROFEN 400 MG/1
400-800 TABLET, FILM COATED ORAL EVERY 6 HOURS PRN
Qty: 120 TABLET | Refills: 1 | Status: SHIPPED | OUTPATIENT
Start: 2017-05-23

## 2017-05-23 RX ADMIN — IBUPROFEN 800 MG: 400 TABLET ORAL at 17:00

## 2017-05-23 RX ADMIN — IBUPROFEN 800 MG: 400 TABLET ORAL at 11:05

## 2017-05-23 RX ADMIN — IBUPROFEN 800 MG: 400 TABLET ORAL at 01:55

## 2017-05-23 RX ADMIN — SENNOSIDES AND DOCUSATE SODIUM 2 TABLET: 8.6; 5 TABLET ORAL at 11:05

## 2017-05-23 RX ADMIN — SENNOSIDES AND DOCUSATE SODIUM 2 TABLET: 8.6; 5 TABLET ORAL at 21:20

## 2017-05-23 RX ADMIN — ACETAMINOPHEN 650 MG: 325 TABLET, FILM COATED ORAL at 21:20

## 2017-05-23 NOTE — PROGRESS NOTES
[facility]  MATERNAL CHILD HEALTH   SOCIAL WORK PROGRESS NOTE      DATA:     Received phone call from charge RN re: patient not having a car seat.     Yudelka informed this writer that she had contacted her insurance agency prior to delivery re: car seat resources. Yudelka PCP-[facility] is reportedly working with [employer] who confirmed they have a car seat for her, but are still coordinating if they are able to drop off the car seat today or tomorrow. Yudelka informed this writer her brother-in-law plan to pick she, Giulia, and baby Corinna up from the hospital and may be available to  the car seat if it is unable to be dropped off.     INTERVENTION:     Met with patient and Sidney ROBBINS to help problem solve re: car seat. Informed patient that there are not any hospital resources available. Provided family with additional car seat resource information. Coordinated with charge RN and bedside RN who later informed this writer patient had a car seat through [employer]. Updated patient that [employer] has a car seat for her, but are still coordinating if they are able to drop off the car seat today or tomorrow. Provided Yudelka with the information from [employer] in case they need her brother-in-law to  the car seat.     ASSESSMENT:     Yudelka reported no family being able to borrow/purchase a car seat. She did seem to have been in contact with her insurance company re: to obtaining a car seat. However, was unsure where she was in the process. She does seem to have an understanding that [employer] will be supplying the car seat and at this time we are waiting to hear if the car seat will be able to be dropped off. Reported no other questions or concerns at this time.     PLAN:     Bedside RN plans to update patient re: if [employer] is able to drop off the car seat or if brother-in-law needs to  the  car seat. Patient is aware of this plan. Please contact social work if additional needs arise.     MARTÍNEZ Peña, [facility]   Social Worker  Maternal Child Health   Phone: [phone]  Pager: [phone]

## 2017-05-23 NOTE — PLAN OF CARE
Problem: Goal Outcome Summary  Goal: Goal Outcome Summary  Data: Vital signs within normal limits. Postpartum checks within normal limits - see flow record. Patient eating and drinking normally. Patient able to empty bladder independently and is up ambulating. No apparent signs of infection. Perineum healing well. Patient performing self cares and is able to care for infant.  Action: Patient medicated during the shift for cramping. See MAR. Patient reassessed within 1 hour after each medication and pain was improved - patient stated she was comfortable. Patient education done, see flow record.  Response: Positive attachment behaviors observed with infant. Support person (FOB) present.   Plan: Anticipate discharge this evening if baby OK for DC.

## 2017-05-23 NOTE — PROGRESS NOTES
Yudelka Harris      MRN#: [id number]  Age: 32 year old      YOB: 1985      PPD #1 S/P   Patient feels well and is without issue, baby is rooming in  Breast feeding status:initiated - infant sleepy but had good feed this morning.   Complications since 2 hours post delivery: None  Patient is tolerating regular diet,  tolerating acitivity well, voiding without difficulty, cramping is relieved by Ibuprofen, lochia is decreasing, no clots.  Perineal pain is is relieved by Ibuprofhen.  The perineum laceration is well approximated.   No BM, +flatus.       Pt communicating effectively with assistance from .     - Plans partner vasectomy for birth control, will use abstinence/condoms until that time.  Declines to discuss LARC or other hormonal options.   - Plans to continue follow up postpartum with [facility].  - Questions about obtaining car seat, doesn't feel like she has anyone who could help her get one.  Agreeable to have social work involved in planning.   Would like to go home this evening if her daughter is cleared for discharge.     SIGNIFICANT PROBLEMS:  Patient Active Problem List    Diagnosis Date Noted     Labor and delivery, indication for care 2017     Priority: Medium      (normal spontaneous vaginal delivery) 2014     Priority: Medium         PE:    Vitals:    17 2315 17 0015 17 0400 17 0939   BP:  103/60 94/61 111/68   Pulse:  96 70 77   Resp: 16 16 16 16   Temp:  98.1  F (36.7  C) 98  F (36.7  C) 97.9  F (36.6  C)   TempSrc:  Oral Oral Oral   SpO2: 98% 97%         NAD  Caridac- Regular rate and rhythm  Lungs- CTA bilat  Breasts: Soft, filling  Nipples: Intact, Non-tender  Abdomen: Soft, Non-tender    Diastatis Recti:  2 FB  Uterus: Fundus Firm, Non-tender, located 1cm below the umbilicus   Lochia: Rubra, appropriate amount    Perineum:  Well-approximated, healing well  Lower Extremities: Trace Edema  Bilateral, Negative Attila's Sign        Postpartum hemoglobin   Hemoglobin   Date Value Ref Range Status   2017 11.2 (L) 11.7 - 15.7 g/dL Final     Blood type   Lab Results   Component Value Date    ABO O 2017       Lab Results   Component Value Date    RH  Pos 2017     Rubella Immune      Assessment/Plan-   Stable Post-partum day #1 s/p   Complications: No carseat.   Breastfeeding  Rhophylac not indicated    Teaching done: D/C Instructions: Nutrition/Activity, Engorgement Management, Birth Control Options, Warning Signs/When to Call: Excessive Bleeding, Infection, PP Depression, Kegals and Crunches, RTC Clinic for PP Appointment and PNV    Postpartum warning s/s reviewed, including bleeding/clots, fever, mastitis, or depression    Birthcontrol planned:Partner vasectomy.  Plans abstinence/condoms until that time.  Reinforced possibility of pregnancy before menses return if no birth control used.     Reinforced WHS pager prn after discharge if questions/concerns.     Current Discharge Medication List      CONTINUE these medications which have NOT CHANGED    Details   Prenatal Vit-Fe Fumarate-FA (PRENATAL MULTIVITAMIN  PLUS IRON) 27-0.8 MG TABS Take 1 tablet by mouth daily  Qty: 100 tablet, Refills: 3    Associated Diagnoses: Postpartum care and examination immediately after delivery             - Spoke with [facility] [place] office re: carseat assistance - CNM out of office but office staff will be in this afternoon, will contact this writer to assist with additional resources.   Spoke with [facility] prenatal services - 1 to 2 month waiting list for equipment.   Spoke with PP charge RN who noted that pt's insurance would qualify for $50 credit if they bought their own carseat.  Consider medical taxi for discharge if unable to obtain carseat before discharge.  Social work requested to come and speak with pt again by charge RN. This writer will update charge RN if additional resources identified  by [facility].       Routine Postpartum Management  Postpartum discharge class today if interested  Anticipate discharge to home this evening if clinically appropriate for infant and car seat resources identified.   RTC 6 weeks at [facility] and prn         Ludy LYON CNM    ADDENDUM 5/23/2017 11:30 AM - heard back from Renetta from [facility].  Car seat was previously arranged through [employer] [facility] - is ready/available to be dropped off.  Will update this writer if able to be dropped off today.  Charge RN updated.  Ludy LYON CNM      ADDENDUM 5/23/2017 2:09 PM - heard back form Renetta from [facility].  Car seat will be delivered to pt at hospital today at 1900.  Updated Charge RN. Ludy LYON CNM

## 2017-05-23 NOTE — PLAN OF CARE
Problem: Labor (Cervical Ripen, Induct, Augment) (Adult,Obstetrics,Pediatric)  Goal: Signs and Symptoms of Listed Potential Problems Will be Absent or Manageable (Labor)  Signs and symptoms of listed potential problems will be absent or manageable by discharge/transition of care (reference Labor (Cervical Ripen, Induct, Augment) (Adult,Obstetrics,Pediatric) CPG).   Outcome: Completed Date Met:  05/22/17  Pt VSS. Afebrile. SROM after straight catheterization at 1640 clear fluid. AROM of forebag by MIGUEL Prince CNM and complete at 1644. Began pushing at 1644 but pushing efforts stopped and patient labored down at 1701. Began pushing again at 1807. Baby girl born vaginally at 1819. FHR category 1 during 1st stage of labor. Intermittent variable decels during first attempt at pushing. Continue with current plan of care.

## 2017-05-23 NOTE — PLAN OF CARE
Problem: Postpartum, Vaginal Delivery (Adult)  Goal: Signs and Symptoms of Listed Potential Problems Will be Absent or Manageable (Postpartum, Vaginal Delivery)  Signs and symptoms of listed potential problems will be absent or manageable by discharge/transition of care (reference Postpartum, Vaginal Delivery (Adult) CPG).   Outcome: Improving  Pt stable this shift with postpartum checks. Pt is voiding and emptying without difficulty. Pt is breastfeeding her baby with a good latch observed. Worked on getting baby fully awake before trying to latch. Pt wanted to wait to see if we can do bath on baby in morning. Pt is having good pain relief and is taking ibuprofen and using warm pack also.. Will continue to monitor for changes and assist as needed.

## 2017-05-23 NOTE — DISCHARGE INSTRUCTIONS
Vaginal Delivery Discharge Instructions: German  Actividad:     Pida a los miembros de vazquez king y amigos que la ayuden cuando lo necesite.    No ponga nada en vazquez vagina hasta que vazquez médico lo permita.    Tómese las próximas semanas con calma para que vazquez cuerpo tenga tiempo de recuperarse. En fabrizio momento puede hacer cualquier actividad que sienta que puede.    No conduzca si está tomando píldoras para el dolor recetadas por vazquez médico. Puede conducir si está tomando píldoras de venta javier para el dolor.    Llame a vazquez proveedor de atención médica si tiene alguno de estos síntomas:    Empapa wilton toalla femenina con bárbara en el correr de 1 hora o ve coágulos más grandes que wilton pelota de golf.    Sangrado que dura más de 6 semanas.    Tiene wilton secreción vaginal que huele mal.     Fiebre de 100.4  F (38  C) o más (temperatura tomada bajo vazquez lengua) con o sin escalofríos     Dolor, calambres o sensibilidad graves en la región inferior de vazquez vientre.    Aumento del dolor, hinchazón, enrojecimiento o líquido alrededor de ronan puntos.    Necesidad más frecuente o urgente de orinar (hacer pis), o ardor al hacerlo.    Enrojecimiento, hinchazón o dolor alrededor de wilton vena en vazquez pierna.    Problemas para amamantar o un área enrojecida o dolorosa en vazquez pecho.    Dolor que aumenta o no se va de wilton episiotomía o desgarro en el perineo.    Náuseas y vómitos    Dolor en el pecho y tos o dificultad para respirar.    Problemas para manejar la tristeza, ansiedad o depresión.     Si le preocupa hacerse daño o hacerle daño al bebé, llame al médico de inmediato.     Tiene preguntas o inquietudes después de regresar a casa.    Mantenga ronan heriebrto limpias:  Lávese siempre las heriberto antes de tocar el área de vazquez perineo y los puntos.  [place]aton ayuda a reducir vazquez riesgo de infección.  Si ronan heriberto no están sucias, puede usar un gel de alcohol para limpiarse las heriberto. Mantenga ronan uñas cortas y limpias.      Vaginal Delivery Discharge  Instructions  Activity:     Ask family and friends for help when you need it.    Do not place anything in your vagina until your doctor approves.    Take it easy for the next few weeks to allow your body to recover. You may do any activities you feel up to at that point.    Do not drive while taking pain pills prescribed by your doctor. You may drive if taking over-the-counter pain pills.    Call your health care provider if you have any of these symptoms:    You soak a sanitary pad with blood within 1 hour, or you see blood clots larger than a golf ball.    Bleeding that lasts more than 6 weeks.    You have vaginal discharge that smells bad.     A fever of 100.4  F (38  C) or higher (temperature taken under your tongue), with or without chills     Severe, pain, cramping or tenderness in your lower belly area.    Increased pain, swelling, redness or fluid around your stitches.    A more frequent or urgent need to urinate (pee), or it burns when you pee.    Redness, swelling or pain around a vein in your leg.    Problems breastfeeding, or a red or painful area on your breast.    Pain that increases or does not go away from an episiotomy or perineal tear.    Nausea and vomiting.    Chest pain and cough or are gasping for air.    Problems coping with sadness, anxiety, or depression.     If you have any concerns about hurting yourself or the baby, call your doctor right away.     You have questions or concerns after you return home.    Keep your hands clean:  Always wash your hands before touching your perineal area and stitches.  This helps reduce your risk of infection.  If your hands aren t dirty, you may use an alcohol hand-rub to clean your hands. Keep your nails clean and short.

## 2017-05-23 NOTE — PLAN OF CARE
Problem: Goal Outcome Summary  Goal: Goal Outcome Summary  Outcome: Improving  Transferred to 7122 this evening, accompanied by infant and sister.  Oriented to room and plan for the evening, with  present. Initial teaching done, and demonstrated how to access teaching videos in Palauan. Breastfeeding infant with minimal assistance from nurse. Instructed patient that deeper latch would yield more colostrum and make her less prone to soreness. L and D RN April P states patient is concerned that she has no car seat, and April placed a SS consult to address that.  Patient expresses that she would like to be discharged as soon as possible after 24 hours.

## 2017-05-23 NOTE — DISCHARGE SUMMARY
[facility] Discharge Summary    Yudelka Harris MRN# [id number]   Age: 32 year old YOB: 1985     Date of Admission:  2017  Date of Discharge::  2017  Admitting Physician:  SONALI Campbell CNM  Discharge Physician:  Ludy Samuels CNM, MS      Home clinic: [facility]          Admission Diagnoses:   maternity*teresa  6/3/17/labor  Labor and delivery, indication for care   (normal spontaneous vaginal delivery)          Discharge Diagnosis:   Normal spontaneous vaginal delivery  Intrauterine pregnancy at 38 weeks gestation          Procedures:   Procedure(s): Repair of first degree perineal laceration       No other significant procedures performed during this admission           Medications Prior to Admission:     Prescriptions Prior to Admission   Medication Sig Dispense Refill Last Dose     Prenatal Vit-Fe Fumarate-FA (PRENATAL MULTIVITAMIN  PLUS IRON) 27-0.8 MG TABS Take 1 tablet by mouth daily 100 tablet 3 2017 at Unknown time             Discharge Medications:     Current Discharge Medication List      START taking these medications    Details   acetaminophen (TYLENOL) 325 MG tablet Take 2 tablets (650 mg) by mouth every 6 hours as needed for mild pain or fever (greater than or equal to 38 C /100.4 F (oral))  Qty: 100 tablet, Refills: 1    Associated Diagnoses:  (normal spontaneous vaginal delivery)      ibuprofen (ADVIL/MOTRIN) 400 MG tablet Take 1-2 tablets (400-800 mg) by mouth every 6 hours as needed for other (cramping)  Qty: 120 tablet, Refills: 1    Associated Diagnoses:  (normal spontaneous vaginal delivery)      senna-docusate (SENOKOT-S;PERICOLACE) 8.6-50 MG per tablet Take 1-2 tablets by mouth 2 times daily as needed for constipation  Qty: 100 tablet, Refills: 1    Associated Diagnoses:  (normal spontaneous vaginal delivery)         CONTINUE these medications which have NOT CHANGED    Details   Prenatal Vit-Fe  "Fumarate-FA (PRENATAL MULTIVITAMIN  PLUS IRON) 27-0.8 MG TABS Take 1 tablet by mouth daily  Qty: 100 tablet, Refills: 3    Associated Diagnoses: Postpartum care and examination immediately after delivery                   Consultations:   No consultations were requested during this admission          Brief History of Labor:   DELIVERY NOTE:  Brief Labor Course: pt arrived in active labor, pos GBS adequately treated. had good relief from epidural and progressed to complete. Labored down x1 hour and pushed well.  Delivery Note:    viable Fe, tight CAN, somersault out. Cried with stimulation and placed on moms abd. Spont ng placenta delivered intact. 1st degree laceration repaired without complication in the usual fashion. Mom and baby stable  IUP at 38 weeks gestation delivered on May 22, 2017.    delivery of a viable Female infant.  Weight : pending  Apgars of 8 at 1 minute and 9 at 5 minutes.  Labor was spontaneous.  Medications administered in labor: Pain Rx Epidural; Antibiotics Yes: PCN and IV antibiotics infused greater than 4 hours; Other   Perineum: 1st degree  Placenta-mechanism: spontaneous, intact, with a 3 vessel cord. IV oxytocin was given After delivery of baby  Quantitative Blood Loss was 179cc.  Complications of labor and delivery: tight CAN  Anticipated Discharge Date: 17  Birth attendants: SONALI Wilson CNM, CNM     Assessment Day of Discharge    Vital signs:  Temp: 98.1  F (36.7  C) Temp src: Oral BP: 107/63 Pulse: 75 Heart Rate: 72 Resp: 18 SpO2: 97 %          Estimated body mass index is 31.09 kg/(m^2) as calculated from the following:    Height as of 14: 1.575 m (5' 2\").    Weight as of 17: 77.1 kg (170 lb).      NAD  Caridac- Regular rate and rhythm  Lungs- CTA bilat  Breasts: Soft, filling  Nipples: Intact, Non-tender  Abdomen: Soft, Non-tender Diastatis Recti: 2 FB  Uterus: Fundus Firm, Non-tender, located 1cm below the umbilicus "   Lochia: Rubra, appropriate amount   Perineum: Well-approximated, healing well  Lower Extremities: Trace Edema Bilateral, Negative Attila's Sign           Hospital Course:   The patient's hospital course was unremarkable.  On discharge, her pain was well controlled. Vaginal bleeding is similar to peak menstrual flow.  Voiding without difficulty.  Ambulating well and tolerating a normal diet.  No fever.  Breastfeeding is established - pt bresatfed other two infants without issue.  Infant is stable.  No bowel movement yet but +flatus.  She was discharged on post-partum day #1.    Post-partum hemoglobin:   Hemoglobin   Date Value Ref Range Status   2017 11.2 (L) 11.7 - 15.7 g/dL Final        Rh:positive  Rubella status:Immune  Plan for contraception:Partner plans vasectomy.  Abstinence then condoms until done.   Reviewed Chapter One of  [facility] including warning signs of postpartum, activity level, avoiding IC for 6 weeks, Tub soaks BID, Kegels, abdominal exercises, breast care,  postpartum depression/anxiety. Pt verbalized understanding with teach back.          Discharge Instructions and Follow-Up:   Discharge diet: Regular   Discharge activity: Pelvic rest: abstain from intercourse and do not use tampons for 6 week(s)   Discharge follow-up: Follow up with CNM at [facility] in 1-2 weeks for breastfeeding support  Follow up with CNM at [facility] at 6 weeks for postpartum visit    Wound care: Drink plenty of fluids  Ice to area for comfort  Keep wound clean and dry           Discharge Disposition:   Discharged to home        SONALI Spencer CNM

## 2017-05-23 NOTE — PLAN OF CARE
Data: Yudelka Harris transferred to Postpartum room 7122 via wheelchair at 2045. Baby transferred via parent's arms.  Action: Receiving unit notified of transfer: Yes. Patient and family notified of room change. Report given to Shazia HEREDIA at 2050. Belongings sent to receiving unit. Accompanied by Registered Nurse. Oriented patient to surroundings. Call light within reach. ID bands double-checked with receiving RN.  Response: Patient tolerated transfer and is stable.

## 2017-05-24 NOTE — PLAN OF CARE
Problem: Goal Outcome Summary  Goal: Goal Outcome Summary  Outcome: Adequate for Discharge Date Met:  05/23/17  Patient taking care of self and infant independently. Breastfeeding independently, good latch observed. Postpartum checks wnl. Pt educated on discharge instructions and given written handout (In Bel[place]ian and English), verbalized understanding of instructions. Discharge meds not available as discharge pharmacy closed, but will pass on that meds are given to pt before she goes home tomorrow. Patient discharged, but will be boarding in room with baby. Pt verbalized understanding that pt is discharged and assessments will not be done on pt.

## 2017-05-24 NOTE — PLAN OF CARE
Referral made to [facility] care for early discharge. Home care referral placed day after mother's dc d/t mother staying in the hospital overnight with infant inpatient d/t elevated bilirubin.

## 2017-05-24 NOTE — PROGRESS NOTES
[facility]  MATERNAL CHILD HEALTH   SOCIAL WORK PROGRESS NOTE    DATA:     SW consulted due to pt's expressed need for a car seat upon discharge. SW completed a chart review. Pt met today with Maternal Child Health , Elizabeth Self, who problem solved with family and provided community car seat resources. At this time, SW understands from [facility] charge nurse that pt uncertain if she would like to discharge tonight or tomorrow. If pt discharges tonight and cannot obtain a car seat, SW provided contact information for [facility], who have car seats available. If pt discharges tomorrow, SW reiterated need for family to coordinate car seat  from [employer] (see previous SW note).    INTERVENTION:     Problem solved with medical team about pt's expressed need for a car seat. Provided transportation resources available this evening to support safe transport home with car seat for baby. Reiterated need for family to be in touch with resources discussed with SW this afternoon. Family and medical team aware of resources available to pt and family to support them obtaining a car seat.    PLAN:     Family aware of resource available to provide them a car seat for baby, [employer]; family needs to coordinate  of car seat with organization.      MARTÍNEZ Orozco, [facility]  Clinical   Maternal Child Health  [facility]  Phone:   [phone]  Pager:    [phone]